# Patient Record
Sex: MALE
[De-identification: names, ages, dates, MRNs, and addresses within clinical notes are randomized per-mention and may not be internally consistent; named-entity substitution may affect disease eponyms.]

---

## 2024-08-16 ENCOUNTER — APPOINTMENT (OUTPATIENT)
Dept: ORTHOPEDIC SURGERY | Facility: CLINIC | Age: 64
End: 2024-08-16

## 2024-08-16 DIAGNOSIS — J44.9 CHRONIC OBSTRUCTIVE PULMONARY DISEASE, UNSPECIFIED: ICD-10-CM

## 2024-08-16 DIAGNOSIS — M19.019 PRIMARY OSTEOARTHRITIS, UNSPECIFIED SHOULDER: ICD-10-CM

## 2024-08-16 DIAGNOSIS — Z21 ASYMPTOMATIC HUMAN IMMUNODEFICIENCY VIRUS [HIV] INFECTION STATUS: ICD-10-CM

## 2024-08-16 DIAGNOSIS — I51.9 HEART DISEASE, UNSPECIFIED: ICD-10-CM

## 2024-08-16 DIAGNOSIS — Z00.00 ENCOUNTER FOR GENERAL ADULT MEDICAL EXAMINATION W/OUT ABNORMAL FINDINGS: ICD-10-CM

## 2024-08-16 PROCEDURE — 72040 X-RAY EXAM NECK SPINE 2-3 VW: CPT

## 2024-08-16 PROCEDURE — 73030 X-RAY EXAM OF SHOULDER: CPT | Mod: RT

## 2024-08-16 PROCEDURE — 73010 X-RAY EXAM OF SHOULDER BLADE: CPT | Mod: RT

## 2024-08-16 PROCEDURE — 99204 OFFICE O/P NEW MOD 45 MIN: CPT

## 2024-08-16 RX ORDER — EMTRICITABINE, RILPIVIRINE HYDROCHLORIDE, AND TENOFOVIR ALAFENAMIDE 200; 25; 25 MG/1; MG/1; MG/1
TABLET ORAL
Refills: 0 | Status: ACTIVE | COMMUNITY

## 2024-08-16 RX ORDER — ASPIRIN 81 MG
81 TABLET, DELAYED RELEASE (ENTERIC COATED) ORAL
Refills: 0 | Status: ACTIVE | COMMUNITY

## 2024-08-16 RX ORDER — MELOXICAM 7.5 MG/1
7.5 TABLET ORAL
Qty: 30 | Refills: 0 | Status: ACTIVE | COMMUNITY
Start: 2024-08-16 | End: 1900-01-01

## 2024-08-16 NOTE — DISCUSSION/SUMMARY
[de-identified] : The natural progression of osteoarthritis was explained to the patient.  We discussed the possible treatment options from conservative to operative.  These included NSAIDS, Glucosamine and Chondrotin sulfate, and Physical Therapy as well different types of injections.  We also discussed that at some point they may progress to needing a TSA.  Information and pamphlets were given.    We discussed their diagnosis and treatment options at length including surgical and non-surgical options. We will first attempt conservative treatment with activity modification, PT, icing, weight loss, and anti-inflammatory medications. We discussed the possible of injections (steroid and viscosupplementation) in the future. The patient was provided with a PT prescription to work on ROM, RTC strengthening, shoulder stretches and strengthening, and other exercises on the Shoulder Arthritis Protocol.    Dx / Natural History:  The patient was advised of the diagnosis.  The natural history of the pathology was explained in full to the patient in layman's terms.  Several different treatment options were discussed and explained in full to the patient including the risks and benefits of both surgical and non-surgical treatments.  All questions and concerns were answered.     Pain Guide Activities:  The patient was advised to let pain guide the gradual advancement of activities.    Physical Therapy:  The patient was provided with a prescription for Physical Therapy.    Icing:  The patient was advised to apply ice (wrapped in a towel or protective covering) to the area daily (20 minutes at a time, 2-4X/day).    Follow up in 4-6 weeks to re-evaluate.  Pt has HIV/AIDS COPD/Cardiac

## 2024-08-16 NOTE — HISTORY OF PRESENT ILLNESS
[de-identified] : 08/16/2024:  Date of Injury/Onset: 1 year ago Pain: At Rest: With Activity: Mechanism of injury: Patient reports pain in b/l shoulders R>L. No injury. Pain in R shoulder radiates down triceps, exacerbates when lifting arm. L shoulder pain radiates down arm to thumb with n/t. Saw PCP, had C Spine & R shoulder MRI's @ 4/2024. No PT or injections. Taking Tramadol and Tylenol w/o much relief.  This is NOT a Work Related Injury being treated under Worker's Compensation. This is NOT an athletic injury occurring associated with an interscholastic or organized sports team. Quality of symptoms: Sharp, radiating.  Improves with: rest Worse with: ROM  Prior treatment: meds Prior Imaging: MRI R shoulder & C Spine.  Reports Available For Review Today: R shoulder & C Spine MRI.  Out of work/sport: No School/Sport/Position/Occupation: Retired +HIV/AIDs

## 2024-08-16 NOTE — DATA REVIEWED
[FreeTextEntry1] : 4 v R shoulder: neg for fx or dislocation JSN    MRI of the right shoulder demonstrates:  1.  Mild to moderate rotator cuff tendinosis without tear. Mild subacromial/subdeltoid bursitis. 2.  Severe intra-articular biceps tendinosis. Medial subluxation of the long head biceps tendon. 3.  Moderate acromioclavicular joint arthrosis. 4.  Tear of the superior and posterior superior labrum. 5.  Severe glenohumeral joint arthrosis. Moderate effusion and synovitis. Posterior subluxation of the humeral head.

## 2024-08-16 NOTE — IMAGING
[de-identified] : RIGHT SHOULDER  Inspection: No swelling.   Palpation: Tenderness is noted at the bicipital groove, anterior and lateral.   Range of motion: There is pain with range of motion.  , ER 55, @90ER 90, @90IR 30  Strength: There is pain, weakness, and discomfort with strength testing.  Forward Flexion 3/5. Abduction 3/5.  External Rotation 4/5 and Internal Rotation 5-/5   Neurological testings: motor and sensor intact distally.  Ligament Stability and Special Tests:   There is positive arc of pain.   Shoulder apprehension: neg  Shoulder relocation: neg  Obriens test: pos  Biceps Active test: neg  Lopez Labral Shear: neg  Impingement testing: pos  Stephan testing: pos  Whipple: pos  Cross Body Adduction: neg

## 2024-09-16 ENCOUNTER — APPOINTMENT (OUTPATIENT)
Dept: PAIN MANAGEMENT | Facility: CLINIC | Age: 64
End: 2024-09-16
Payer: MEDICAID

## 2024-09-16 VITALS — BODY MASS INDEX: 24.64 KG/M2 | HEIGHT: 67 IN | WEIGHT: 157 LBS

## 2024-09-16 DIAGNOSIS — Z87.891 PERSONAL HISTORY OF NICOTINE DEPENDENCE: ICD-10-CM

## 2024-09-16 DIAGNOSIS — R41.3 OTHER AMNESIA: ICD-10-CM

## 2024-09-16 DIAGNOSIS — M54.12 RADICULOPATHY, CERVICAL REGION: ICD-10-CM

## 2024-09-16 DIAGNOSIS — Z86.19 PERSONAL HISTORY OF OTHER INFECTIOUS AND PARASITIC DISEASES: ICD-10-CM

## 2024-09-16 DIAGNOSIS — B20 HUMAN IMMUNODEFICIENCY VIRUS [HIV] DISEASE: ICD-10-CM

## 2024-09-16 DIAGNOSIS — M51.9 UNSPECIFIED THORACIC, THORACOLUMBAR AND LUMBOSACRAL INTERVERTEBRAL DISC DISORDER: ICD-10-CM

## 2024-09-16 DIAGNOSIS — B59 PNEUMOCYSTOSIS: ICD-10-CM

## 2024-09-16 PROCEDURE — 99204 OFFICE O/P NEW MOD 45 MIN: CPT

## 2024-09-16 RX ORDER — ATORVASTATIN CALCIUM 40 MG/1
40 TABLET, FILM COATED ORAL
Refills: 0 | Status: ACTIVE | COMMUNITY

## 2024-09-16 RX ORDER — ASPIRIN 81 MG
81 TABLET, DELAYED RELEASE (ENTERIC COATED) ORAL
Refills: 0 | Status: ACTIVE | COMMUNITY

## 2024-09-16 RX ORDER — TRAMADOL HYDROCHLORIDE 100 MG/1
100 TABLET, COATED ORAL
Refills: 0 | Status: ACTIVE | COMMUNITY

## 2024-09-16 NOTE — PHYSICAL EXAM
[de-identified] : Gen: NAD Neck: +spurling's on the LEFT Head: NC/AT Eyes: no glasses, no scleral icterus ENT: mucous membranes moist CV: no JVD Lungs: CTAB, nonlabored breathing Abd: soft, NT/ND Ext: full ROM in all extremities, no peripheral edema Back: +TTP in the bilateral low lumbar facet region Neuro: CN intact UEs +5 L +5 R shoulder abduction +5 L +5 R arm abduction +5 L +5 R forearm flexion +5 L +5 R forearm extension +5 L +5 R finger flexion +5 L +5 R  strength LEs +5 L +5 R hip flexion +5 L +5 R leg extension +5 L +5 R leg flexion +5 L +5 R foot dorsiflexion +5 L +5 R foot plantarflexion +5 L +5 R EHL extension Psych: normal affect Skin: no visible lesions

## 2024-09-16 NOTE — DATA REVIEWED
[Cervical Spine] : cervical spine [MRI] : MRI [Lumbar Spine] : lumbar spine [Report was reviewed and noted in the chart] : The report was reviewed and noted in the chart [I independently reviewed and interpreted images and report] : I independently reviewed and interpreted images and report [FreeTextEntry1] : 4/15/2024 MRI Lumbar Spine ([facility]) FINDINGS: For purposes of this dictation, the last well-formed disc space will be labeled L5-S1.  OSSEOUS STRUCTURES: Vertebral body heights are preserved.  No aggressive osseous lesion.  ALIGNMENT: Mild straightening of the lordosis. No spondylolisthesis.  SPINAL CORD AND CONUS MEDULLARIS: No definite cord signal abnormality.  PARASPINAL AND INTRA-ABDOMINAL SOFT TISSUES: No significant finding.  DISCS: Mild diffuse loss of disc height and signal.  The following axial levels are imaged and detailed below: Probable congenital spinal canal stenosis and/or flattening with superimposed degenerative change as described:  L1-L2: No significant spinal canal or neural foraminal stenosis.  L2-L3: Bilateral foraminal protrusions cause mild bilateral neural foraminal stenosis without significant spinal canal stenosis.   L3-L4: Disc bulge, ligamentum flavum thickening and facet arthropathy cause: Mild spinal canal stenosis. Moderate right neural foraminal stenosis. Moderate left neural foraminal stenosis.  Probable perineural root sleeve cyst versus tiny schwannoma in the right neural foramen . (see key images)    L4-L5: Disc bulge, ligamentum flavum thickening and facet arthropathy cause: Mild/moderate spinal canal stenosis. Bilateral lateral recess stenosis and possible compression of the descending nerve roots. Moderate right neural foraminal stenosis. Moderate left neural foraminal stenosis.  L5-S1: Disc bulge, central protrusion, ligamentum flavum thickening and facet arthropathy cause: Mild spinal canal stenosis. Mild/moderate right neural foraminal stenosis. Mild/moderate left neural foraminal stenosis.  IMPRESSION: Probable congenital spinal canal stenosis and/or flattening with superimposed degenerative change as described:  L4-L5: Disc bulge, ligamentum flavum thickening and facet arthropathy cause: Mild/moderate spinal canal stenosis. Bilateral lateral recess stenosis and possible compression of the descending nerve roots. Moderate right neural foraminal stenosis. Moderate left neural foraminal stenosis.  L3-L4: Disc bulge, ligamentum flavum thickening and facet arthropathy cause: Mild spinal canal stenosis. Moderate right neural foraminal stenosis. Moderate left neural foraminal stenosis. Probable perineural root sleeve cyst versus tiny schwannoma in the right neural foramen. (see key images) clinical correlation recommended. Dedicated MRI of the lumbar spine with and without contrast can be obtained as indicated.  L5-S1: Disc bulge, central protrusion, ligamentum flavum thickening and facet arthropathy cause: Mild spinal canal stenosis. Mild/moderate right neural foraminal stenosis. Mild/moderate left neural foraminal stenosis.  4/15/2024 MRI Cervical Spine ([facility]) FINDINGS:  OSSEOUS STRUCTURES: Vertebral body heights are maintained. Signal abnormality in the vertebral endplates adjacent to the C3-4 junction and left C3-4 facet joint most compatible with ongoing degenerative change. If there is clinical concern for nondisplaced fracture CT of the cervical spine without contrast is recommended for further evaluation.  ALIGNMENT: Mild reversal of the lordosis centered at C3-4. Grade 1 retrolisthesis of C5 on C6.  SPINAL CORD: No definite cord signal abnormality.  POSTERIOR FOSSA/CERVICOMEDULLARY JUNCTION: No significant finding.  NECK/PARASPINAL SOFT TISSUES: No significant finding.  DISCS: Moderate loss of disc height and associated degenerative change at C4-5 C5-6 and C6-7, mild at C3-4.  The following axial levels are imaged and detailed below:  C2-C3: No significant spinal canal or neural foraminal stenosis.   C3-C4: Disc bulge, central protrusion uncovertebral spurring and facet arthropathy cause: Mild spinal canal stenosis. Mild flattening of the cord without definite cord signal abnormality or compression. Moderate right neural foraminal stenosis. Severe left neural foraminal stenosis.  C4-C5: Disc bulge, central protrusion uncovertebral spurring and facet arthropathy cause: Mild spinal canal stenosis. Mild flattening of the cord without definite cord signal abnormality or compression. Moderate right neural foraminal stenosis. Severe left neural foraminal stenosis.  C5-C6: Disc bulge, uncovertebral spurring and facet arthropathy cause: Mild spinal canal stenosis. Moderate right neural foraminal stenosis. Moderate left neural foraminal stenosis.  C6-C7: Disc bulge, uncovertebral spurring and facet arthropathy cause: Mild spinal canal stenosis. Moderate right neural foraminal stenosis. Moderate left neural foraminal stenosis.  C7-T1: No significant spinal canal or neural foraminal stenosis.   IMPRESSION:   Signal abnormality in the vertebral endplates adjacent to the C3-4 junction and left C3-4 facet joint most compatible with ongoing degenerative change. If there is clinical concern for nondisplaced fracture CT of the cervical spine without contrast is recommended for further evaluation.  C3-C4: Disc bulge, central protrusion uncovertebral spurring and facet arthropathy cause: Mild spinal canal stenosis. Mild flattening of the cord without definite cord signal abnormality or compression. Moderate right neural foraminal stenosis. Severe left neural foraminal stenosis.  C4-C5: Disc bulge, central protrusion uncovertebral spurring and facet arthropathy cause: Mild spinal canal stenosis. Mild flattening of the cord without definite cord signal abnormality or compression. Moderate right neural foraminal stenosis. Severe left neural foraminal stenosis.  C5-C6: Disc bulge, uncovertebral spurring and facet arthropathy cause: Mild spinal canal stenosis. Moderate right neural foraminal stenosis. Moderate left neural foraminal stenosis.  C6-C7: Disc bulge, uncovertebral spurring and facet arthropathy cause: Mild spinal canal stenosis. Moderate right neural foraminal stenosis. Moderate left neural foraminal stenosis.  Bilateral subcentimeter perineural root sleeves cysts are noted at T1-2.

## 2024-09-16 NOTE — HISTORY OF PRESENT ILLNESS
[Neck] : neck [Left Arm] : left arm [Left Leg] : left leg [Right Arm] : right arm [Right Leg] : right leg [8] : 8 [10] : 10 [Burning] : burning [Dull/Aching] : dull/aching [Radiating] : radiating [Shooting] : shooting [Throbbing] : throbbing [Tightness] : tightness [Tingling] : tingling [Constant] : constant [Household chores] : household chores [Leisure] : leisure [Meds] : meds [Sitting] : sitting [Standing] : standing [Walking] : walking [] : no [FreeTextEntry1] : b/l shoulder  [FreeTextEntry7] : b/l arms and legs  [FreeTextEntry6] : numbness and tingling in upper back, b/l shoulder and arms  [FreeTextEntry9] : Tramadol and over the counter pain medications  [de-identified] : x-ray and MRI ([facility])

## 2024-09-16 NOTE — DISCUSSION/SUMMARY
[de-identified] : ALIDA MOTT is a 64 year-old man presenting for a NPV for a history of chronic neck and low back pain.   Prior treatment: Chronic opioid therapy - hydrocodone Tramadol Heat, rest, ice  Plan: 1) MRI cervical/lumbar spine images reviewed with the patient. 2) Initiate physical therapy - script provided 3) Schedule C6-C7 MICHAEL w/o MAC. The procedure was explained to the patient in detail. Reviewed risks, benefits, and alternatives with the patient. Some risks discussed included temporary increase in pain, bleeding, infection, and side effects from steroids. The patient expressed understanding and would like to proceed. 4) Continue tramadol 100mg TID through PCP 5) Continue acetaminophen prn 6) Discussed with the patient that I will not take over chronic opioid prescribing 7) RTC post procedure

## 2024-09-16 NOTE — PHYSICAL EXAM
[de-identified] : Gen: NAD Neck: +spurling's on the LEFT Head: NC/AT Eyes: no glasses, no scleral icterus ENT: mucous membranes moist CV: no JVD Lungs: CTAB, nonlabored breathing Abd: soft, NT/ND Ext: full ROM in all extremities, no peripheral edema Back: +TTP in the bilateral low lumbar facet region Neuro: CN intact UEs +5 L +5 R shoulder abduction +5 L +5 R arm abduction +5 L +5 R forearm flexion +5 L +5 R forearm extension +5 L +5 R finger flexion +5 L +5 R  strength LEs +5 L +5 R hip flexion +5 L +5 R leg extension +5 L +5 R leg flexion +5 L +5 R foot dorsiflexion +5 L +5 R foot plantarflexion +5 L +5 R EHL extension Psych: normal affect Skin: no visible lesions

## 2024-09-16 NOTE — HISTORY OF PRESENT ILLNESS
[Neck] : neck [Left Arm] : left arm [Left Leg] : left leg [Right Arm] : right arm [Right Leg] : right leg [8] : 8 [10] : 10 [Burning] : burning [Dull/Aching] : dull/aching [Radiating] : radiating [Shooting] : shooting [Throbbing] : throbbing [Tightness] : tightness [Tingling] : tingling [Constant] : constant [Household chores] : household chores [Leisure] : leisure [Meds] : meds [Sitting] : sitting [Standing] : standing [Walking] : walking [] : no [FreeTextEntry1] : b/l shoulder  [FreeTextEntry7] : b/l arms and legs  [FreeTextEntry6] : numbness and tingling in upper back, b/l shoulder and arms  [FreeTextEntry9] : Tramadol and over the counter pain medications  [de-identified] : x-ray and MRI ([facility])

## 2024-09-16 NOTE — DISCUSSION/SUMMARY
[de-identified] : ALIDA MOTT is a 64 year-old man presenting for a NPV for a history of chronic neck and low back pain.   Prior treatment: Chronic opioid therapy - hydrocodone Tramadol Heat, rest, ice  Plan: 1) MRI cervical/lumbar spine images reviewed with the patient. 2) Initiate physical therapy - script provided 3) Schedule C6-C7 MICHAEL w/o MAC. The procedure was explained to the patient in detail. Reviewed risks, benefits, and alternatives with the patient. Some risks discussed included temporary increase in pain, bleeding, infection, and side effects from steroids. The patient expressed understanding and would like to proceed. 4) Continue tramadol 100mg TID through PCP 5) Continue acetaminophen prn 6) Discussed with the patient that I will not take over chronic opioid prescribing 7) RTC post procedure

## 2024-09-27 ENCOUNTER — APPOINTMENT (OUTPATIENT)
Dept: ORTHOPEDIC SURGERY | Facility: CLINIC | Age: 64
End: 2024-09-27
Payer: MEDICAID

## 2024-09-27 DIAGNOSIS — M19.019 PRIMARY OSTEOARTHRITIS, UNSPECIFIED SHOULDER: ICD-10-CM

## 2024-09-27 PROCEDURE — 99213 OFFICE O/P EST LOW 20 MIN: CPT

## 2024-09-27 RX ORDER — MELOXICAM 15 MG/1
15 TABLET ORAL
Qty: 30 | Refills: 2 | Status: ACTIVE | COMMUNITY
Start: 2024-09-27 | End: 1900-01-01

## 2024-09-27 NOTE — HISTORY OF PRESENT ILLNESS
[de-identified] : 08/16/2024:  Date of Injury/Onset: 1 year ago Pain: At Rest: With Activity: Mechanism of injury: Patient reports pain in b/l shoulders R>L. No injury. Pain in R shoulder radiates down triceps, exacerbates when lifting arm. L shoulder pain radiates down arm to thumb with n/t. Saw PCP, had C Spine & R shoulder MRI's @ 4/2024. No PT or injections. Taking Tramadol and Tylenol w/o much relief.  This is NOT a Work Related Injury being treated under Worker's Compensation. This is NOT an athletic injury occurring associated with an interscholastic or organized sports team. Quality of symptoms: Sharp, radiating.  Improves with: rest Worse with: ROM  Prior treatment: meds Prior Imaging: MRI R shoulder & C Spine.  Reports Available For Review Today: R shoulder & C Spine MRI.  Out of work/sport: No School/Sport/Position/Occupation: Retired +HIV/AIDs  09/27/2024 ALIDA  is here today to follow up on right shoulder. Patient had been taken Mobic, he is about to start PT next week. Patient reports no significant improvement with pain since last visit. Patient reports sharp pain on left shoulder, states pain radiates to left hand. Denies no new injury. He had been taken tramadol, acetaminophen for pain relief. Patient states pain is worse when lifting, and with certain movements, reports limited ROM.

## 2024-09-27 NOTE — IMAGING
[de-identified] : RIGHT SHOULDER  Inspection: No swelling.   Palpation: Tenderness is noted at the bicipital groove, anterior and lateral.   Range of motion: There is pain with range of motion.  , ER 55, @90ER 90, @90IR 30  Strength: There is pain, weakness, and discomfort with strength testing.  Forward Flexion 3/5. Abduction 3/5.  External Rotation 4/5 and Internal Rotation 5-/5   Neurological testings: motor and sensor intact distally.  Ligament Stability and Special Tests:   There is positive arc of pain.   Shoulder apprehension: neg  Shoulder relocation: neg  Obriens test: pos  Biceps Active test: neg  Lopez Labral Shear: neg  Impingement testing: pos  Stephan testing: pos  Whipple: pos  Cross Body Adduction: neg

## 2024-09-27 NOTE — DISCUSSION/SUMMARY
[de-identified] : The natural progression of osteoarthritis was explained to the patient.  We discussed the possible treatment options from conservative to operative.  These included NSAIDS, Glucosamine and Chondrotin sulfate, and Physical Therapy as well different types of injections.  We also discussed that at some point they may progress to needing a TSA.  Information and pamphlets were given.    We discussed their diagnosis and treatment options at length including surgical and non-surgical options. We will first attempt conservative treatment with activity modification, PT, icing, weight loss, and anti-inflammatory medications. We discussed the possible of injections (steroid and viscosupplementation) in the future. The patient was provided with a PT prescription to work on ROM, RTC strengthening, shoulder stretches and strengthening, and other exercises on the Shoulder Arthritis Protocol.    Dx / Natural History:  The patient was advised of the diagnosis.  The natural history of the pathology was explained in full to the patient in layman's terms.  Several different treatment options were discussed and explained in full to the patient including the risks and benefits of both surgical and non-surgical treatments.  All questions and concerns were answered.     Pain Guide Activities:  The patient was advised to let pain guide the gradual advancement of activities.    Physical Therapy:  The patient was provided with a prescription for Physical Therapy.    Icing:  The patient was advised to apply ice (wrapped in a towel or protective covering) to the area daily (20 minutes at a time, 2-4X/day).    Follow up in 4-6 weeks to re-evaluate.  Pt has HIV/AIDS COPD/Cardiac **** 9/27 R shoulder arthritis-- Continue PT, Mobic 15mg Plan for pt to get CSI in neck in a few weeks  next visit: L shoulder/scap xr

## 2024-10-22 ENCOUNTER — APPOINTMENT (OUTPATIENT)
Dept: PAIN MANAGEMENT | Facility: CLINIC | Age: 64
End: 2024-10-22

## 2024-11-01 ENCOUNTER — APPOINTMENT (OUTPATIENT)
Dept: ORTHOPEDIC SURGERY | Facility: CLINIC | Age: 64
End: 2024-11-01

## 2024-11-01 DIAGNOSIS — M75.42 IMPINGEMENT SYNDROME OF LEFT SHOULDER: ICD-10-CM

## 2024-11-01 DIAGNOSIS — M75.32 CALCIFIC TENDINITIS OF LEFT SHOULDER: ICD-10-CM

## 2024-11-01 PROCEDURE — 73030 X-RAY EXAM OF SHOULDER: CPT | Mod: LT

## 2024-11-01 PROCEDURE — 20610 DRAIN/INJ JOINT/BURSA W/O US: CPT | Mod: LT

## 2024-11-01 PROCEDURE — 99214 OFFICE O/P EST MOD 30 MIN: CPT | Mod: 25

## 2024-11-01 PROCEDURE — 73010 X-RAY EXAM OF SHOULDER BLADE: CPT | Mod: LT

## 2024-11-05 ENCOUNTER — APPOINTMENT (OUTPATIENT)
Dept: PAIN MANAGEMENT | Facility: CLINIC | Age: 64
End: 2024-11-05

## 2024-12-27 ENCOUNTER — APPOINTMENT (OUTPATIENT)
Dept: ORTHOPEDIC SURGERY | Facility: CLINIC | Age: 64
End: 2024-12-27